# Patient Record
Sex: FEMALE | Race: BLACK OR AFRICAN AMERICAN | URBAN - METROPOLITAN AREA
[De-identification: names, ages, dates, MRNs, and addresses within clinical notes are randomized per-mention and may not be internally consistent; named-entity substitution may affect disease eponyms.]

---

## 2021-02-25 ENCOUNTER — IMPORTED ENCOUNTER (OUTPATIENT)
Dept: URBAN - METROPOLITAN AREA CLINIC 50 | Facility: CLINIC | Age: 66
End: 2021-02-25

## 2021-02-26 ENCOUNTER — IMPORTED ENCOUNTER (OUTPATIENT)
Dept: URBAN - METROPOLITAN AREA CLINIC 50 | Facility: CLINIC | Age: 66
End: 2021-02-26

## 2021-03-15 ENCOUNTER — IMPORTED ENCOUNTER (OUTPATIENT)
Dept: URBAN - METROPOLITAN AREA CLINIC 50 | Facility: CLINIC | Age: 66
End: 2021-03-15

## 2021-04-17 ASSESSMENT — VISUAL ACUITY
OD_OTHER: 20/25-2.
OS_CC: CF @ 5'
OD_CC: J1+
OS_CC: J1+
OD_CC: 20/20

## 2021-04-17 ASSESSMENT — TONOMETRY
OS_IOP_MMHG: 18
OD_IOP_MMHG: 18

## 2021-06-21 ENCOUNTER — PREPPED CHART (OUTPATIENT)
Dept: URBAN - METROPOLITAN AREA CLINIC 52 | Facility: CLINIC | Age: 66
End: 2021-06-21

## 2021-07-01 ENCOUNTER — 4 MONTH FOLLOW-UP (OUTPATIENT)
Dept: URBAN - METROPOLITAN AREA CLINIC 52 | Facility: CLINIC | Age: 66
End: 2021-07-01

## 2021-07-01 DIAGNOSIS — H25.13: ICD-10-CM

## 2021-07-01 DIAGNOSIS — H40.023: ICD-10-CM

## 2021-07-01 PROCEDURE — 92012 INTRM OPH EXAM EST PATIENT: CPT

## 2021-07-01 ASSESSMENT — TONOMETRY
OS_IOP_MMHG: 21
OD_IOP_MMHG: 18

## 2021-07-01 ASSESSMENT — VISUAL ACUITY
OS_CC: CF 5FT
OD_CC: 20/20

## 2021-07-01 NOTE — PATIENT DISCUSSION
IOP controlled OD and Boderline OS. Patient is non-compliant with Latanoprost. Did not use Latanoprost last night.

## 2021-11-04 ENCOUNTER — 4 MONTH FOLLOW-UP (OUTPATIENT)
Dept: URBAN - METROPOLITAN AREA CLINIC 52 | Facility: CLINIC | Age: 66
End: 2021-11-04

## 2021-11-04 DIAGNOSIS — H40.023: ICD-10-CM

## 2021-11-04 PROCEDURE — 92012 INTRM OPH EXAM EST PATIENT: CPT

## 2021-11-04 ASSESSMENT — TONOMETRY
OD_IOP_MMHG: 18
OS_IOP_MMHG: 18

## 2021-11-04 ASSESSMENT — VISUAL ACUITY
OD_CC: 20/20
OS_CC: CF 5FT

## 2022-03-14 ENCOUNTER — DIAGNOSTICS ONLY (OUTPATIENT)
Dept: URBAN - METROPOLITAN AREA CLINIC 52 | Facility: CLINIC | Age: 67
End: 2022-03-14

## 2022-03-14 DIAGNOSIS — H40.023: ICD-10-CM

## 2022-03-14 PROCEDURE — 92133 CPTRZD OPH DX IMG PST SGM ON: CPT

## 2022-03-14 PROCEDURE — 92083 EXTENDED VISUAL FIELD XM: CPT

## 2022-03-24 ENCOUNTER — COMPREHENSIVE EXAM (OUTPATIENT)
Dept: URBAN - METROPOLITAN AREA CLINIC 52 | Facility: CLINIC | Age: 67
End: 2022-03-24

## 2022-03-24 DIAGNOSIS — H35.372: ICD-10-CM

## 2022-03-24 DIAGNOSIS — H40.023: ICD-10-CM

## 2022-03-24 DIAGNOSIS — H25.13: ICD-10-CM

## 2022-03-24 DIAGNOSIS — H52.01: ICD-10-CM

## 2022-03-24 PROCEDURE — 92015 DETERMINE REFRACTIVE STATE: CPT

## 2022-03-24 PROCEDURE — 92014 COMPRE OPH EXAM EST PT 1/>: CPT

## 2022-03-24 ASSESSMENT — VISUAL ACUITY
OS_CC: CF 5FT
OD_CC: 20/20
OU_CC: J1+

## 2022-03-24 ASSESSMENT — TONOMETRY
OS_IOP_MMHG: 18
OD_IOP_MMHG: 17

## 2022-10-05 ENCOUNTER — FOLLOW UP (OUTPATIENT)
Dept: URBAN - METROPOLITAN AREA CLINIC 52 | Facility: CLINIC | Age: 67
End: 2022-10-05

## 2022-10-05 DIAGNOSIS — H40.052: ICD-10-CM

## 2022-10-05 DIAGNOSIS — H40.011: ICD-10-CM

## 2022-10-05 PROCEDURE — 92012 INTRM OPH EXAM EST PATIENT: CPT

## 2022-10-05 ASSESSMENT — TONOMETRY
OD_IOP_MMHG: 16
OS_IOP_MMHG: 17

## 2022-10-05 ASSESSMENT — VISUAL ACUITY
OD_CC: 20/20
OS_CC: CF 5FT

## 2022-10-05 NOTE — PATIENT DISCUSSION
Dr. Temitope Hernandez [facility]) did patient's injections in the past. Patient will bring previous medical records at next visit so we can scan them in.

## 2022-10-05 NOTE — PATIENT DISCUSSION
Stable IOP 16/17. Patient to continue Latanoprost 1gtt QHS, OU. 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 Suspicious of OD so will keep glaucoma suspect OD. We will see patient back in 6 months for comprehensive examination and HVF 24-2 RHONDA standard and OCT (RNFL). Will continue to monitor patient every 6 months, but will complete testing once a year.

## 2023-07-10 ENCOUNTER — FOLLOW UP (OUTPATIENT)
Dept: URBAN - METROPOLITAN AREA CLINIC 52 | Facility: CLINIC | Age: 68
End: 2023-07-10

## 2023-07-10 DIAGNOSIS — H01.00B: ICD-10-CM

## 2023-07-10 DIAGNOSIS — H01.00A: ICD-10-CM

## 2023-07-10 DIAGNOSIS — H00.14: ICD-10-CM

## 2023-07-10 DIAGNOSIS — H40.011: ICD-10-CM

## 2023-07-10 PROCEDURE — 92012 INTRM OPH EXAM EST PATIENT: CPT

## 2023-07-10 PROCEDURE — 76514 ECHO EXAM OF EYE THICKNESS: CPT

## 2023-07-10 RX ORDER — TOBRAMYCIN AND DEXAMETHASONE 1; 3 MG/ML; MG/ML: 1 SUSPENSION/ DROPS OPHTHALMIC

## 2023-07-10 ASSESSMENT — TONOMETRY
OS_IOP_MMHG: 17
OD_IOP_MMHG: 16
OS_IOP_MMHG: 18
OD_IOP_MMHG: 16

## 2023-07-10 ASSESSMENT — PACHYMETRY
OS_CT_UM: 534
OD_CT_UM: 548

## 2023-07-10 ASSESSMENT — VISUAL ACUITY
OD_CC: 20/20
OS_CC: CF 5FT

## 2024-04-29 ENCOUNTER — ESTABLISHED PATIENT (OUTPATIENT)
Dept: URBAN - METROPOLITAN AREA CLINIC 52 | Facility: CLINIC | Age: 69
End: 2024-04-29

## 2024-04-29 DIAGNOSIS — H00.14: ICD-10-CM

## 2024-04-29 PROCEDURE — 67800 REMOVE EYELID LESION: CPT

## 2024-04-29 PROCEDURE — 99213 OFFICE O/P EST LOW 20 MIN: CPT | Mod: 25

## 2024-04-29 RX ORDER — PREDNISONE 20MG 20 MG/1
1 TABLET ORAL ONCE A DAY
Start: 2024-04-29

## 2024-04-29 RX ORDER — NEOMYCIN SULFATE, POLYMYXIN B SULFATE AND DEXAMETHASONE 3.5; 10000; 1 MG/G; [USP'U]/G; MG/G
OINTMENT OPHTHALMIC TWICE A DAY
Start: 2024-04-29

## 2024-04-29 ASSESSMENT — TONOMETRY
OD_IOP_MMHG: 17
OS_IOP_MMHG: 17
OD_IOP_MMHG: 17
OS_IOP_MMHG: 18

## 2024-04-29 ASSESSMENT — VISUAL ACUITY
OS_CC: CF 5FT
OD_CC: 20/25